# Patient Record
Sex: MALE | Race: BLACK OR AFRICAN AMERICAN | Employment: FULL TIME | URBAN - METROPOLITAN AREA
[De-identification: names, ages, dates, MRNs, and addresses within clinical notes are randomized per-mention and may not be internally consistent; named-entity substitution may affect disease eponyms.]

---
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
## 2022-04-07 ENCOUNTER — HOSPITAL ENCOUNTER (EMERGENCY)
Age: 22
Discharge: HOME | End: 2022-04-08
Attending: EMERGENCY MEDICINE
Payer: COMMERCIAL

## 2022-04-07 DIAGNOSIS — Z76.89 ENCOUNTER FOR ASSESSMENT OF STD EXPOSURE: Primary | ICD-10-CM

## 2022-04-07 LAB
BILIRUBIN URINE: NEGATIVE
BLOOD, URINE: NEGATIVE
CLARITY: CLEAR
COLOR: YELLOW
GLUCOSE URINE: NEGATIVE MG/DL
KETONES, URINE: NEGATIVE MG/DL
LEUKOCYTE ESTERASE, URINE: NEGATIVE
MICROSCOPIC EXAMINATION: NORMAL
NITRITE, URINE: NEGATIVE
PH UA: 8 (ref 5–8)
PROTEIN UA: NEGATIVE MG/DL
SPECIFIC GRAVITY UA: 1.01 (ref 1–1.03)
URINE REFLEX TO CULTURE: NORMAL
URINE TRICHOMONAS EVALUATION: NORMAL
URINE TYPE: NORMAL
UROBILINOGEN, URINE: 0.2 E.U./DL

## 2022-04-07 PROCEDURE — 87491 CHLMYD TRACH DNA AMP PROBE: CPT

## 2022-04-07 PROCEDURE — 87591 N.GONORRHOEAE DNA AMP PROB: CPT

## 2022-04-07 PROCEDURE — 81001 URINALYSIS AUTO W/SCOPE: CPT

## 2022-04-07 PROCEDURE — 99284 EMERGENCY DEPT VISIT MOD MDM: CPT

## 2022-04-08 VITALS
HEIGHT: 66 IN | WEIGHT: 180 LBS | HEART RATE: 79 BPM | SYSTOLIC BLOOD PRESSURE: 136 MMHG | DIASTOLIC BLOOD PRESSURE: 71 MMHG | TEMPERATURE: 97 F | OXYGEN SATURATION: 99 % | RESPIRATION RATE: 16 BRPM | BODY MASS INDEX: 28.93 KG/M2

## 2022-04-08 LAB
C. TRACHOMATIS DNA ,URINE: NEGATIVE
N. GONORRHOEAE DNA, URINE: NEGATIVE

## 2022-04-08 NOTE — ED PROVIDER NOTES
CHIEF COMPLAINT  Exposure to STD (Pts partner states he feels like he has an STD. Pt states no symptoms but wants to get checked out.)      HISTORY OF PRESENT ILLNESS  Corrine Ramírez is a 25 y.o. male presents to the ED with concern for the possibility of STD, he has been sexually active with male partner, but only oral sex, states he has not had any anal penetrative intercourse, requesting STD testing, not currently having symptoms. No dysuria/hematuria/urgency/frequency, no abdominal or flank pain, no sores or lesions of the genitals, no sore throat or fever, no other complaints, modifying factors or associated symptoms. I have reviewed the following from the nursing documentation. Past Medical History:   Diagnosis Date    Anxiety     Asthma     Depression     Dysphonia     Gender dysphoria      Past Surgical History:   Procedure Laterality Date    TYMPANOSTOMY TUBE PLACEMENT       No family history on file. Social History     Socioeconomic History    Marital status: Single     Spouse name: Not on file    Number of children: Not on file    Years of education: Not on file    Highest education level: Not on file   Occupational History    Not on file   Tobacco Use    Smoking status: Former Smoker    Smokeless tobacco: Never Used   Substance and Sexual Activity    Alcohol use: Never    Drug use: Yes     Types: Marijuana Myrtis Regulus)     Comment: occasionally    Sexual activity: Not on file   Other Topics Concern    Not on file   Social History Narrative    Not on file     Social Determinants of Health     Financial Resource Strain:     Difficulty of Paying Living Expenses: Not on file   Food Insecurity:     Worried About [address] in the Last Year: Not on file    Lam of Food in the Last Year: Not on file   Transportation Needs:     Lack of Transportation (Medical): Not on file    Lack of Transportation (Non-Medical):  Not on file   Physical Activity:     Days of Exercise per Week: Not on file    Minutes of Exercise per Session: Not on file   Stress:     Feeling of Stress : Not on file   Social Connections:     Frequency of Communication with Friends and Family: Not on file    Frequency of Social Gatherings with Friends and Family: Not on file    Attends Holiness Services: Not on file    Active Member of Clubs or Organizations: Not on file    Attends Club or Organization Meetings: Not on file    Marital Status: Not on file   Intimate Partner Violence:     Fear of Current or Ex-Partner: Not on file    Emotionally Abused: Not on file    Physically Abused: Not on file    Sexually Abused: Not on file   Housing Stability:     Unable to Pay for Housing in the Last Year: Not on file    Number of [place] in the Last Year: Not on file    Unstable Housing in the Last Year: Not on file     No current facility-administered medications for this encounter. Current Outpatient Medications   Medication Sig Dispense Refill    spironolactone (ALDACTONE) 50 MG tablet Take 50 mg by mouth every morning      estradiol (ESTRACE) 2 MG tablet Place 2 mg under the tongue 2 times daily      albuterol sulfate  (90 Base) MCG/ACT inhaler Inhale 4 puffs into the lungs every 4 hours as needed      ibuprofen (ADVIL;MOTRIN) 600 MG tablet Take 1 tablet by mouth 3 times daily as needed for Pain 30 tablet 0    Mometasone Furo-Formoterol Fum (DULERA IN) Inhale 2 puffs into the lungs daily        Allergies   Allergen Reactions    Macadamia Nut Oil     Pistachio Nut Extract Skin Test Itching       REVIEW OF SYSTEMS  10 systems reviewed, pertinent positives per HPI otherwise noted to be negative. PHYSICAL EXAM  /71   Pulse 79   Temp 97 °F (36.1 °C) (Oral)   Resp 16   Ht 5' 6\" (1.676 m)   Wt 180 lb (81.6 kg)   SpO2 99%   BMI 29.05 kg/m²   GENERAL APPEARANCE: Awake and alert. Cooperative. No acute distress  HEAD: Normocephalic. Atraumatic. EYES: PERRL. EOM's grossly intact. ENT: Mucous membranes are moist.  Airway patent, no stridor  NECK: Supple. No rigidity  HEART: RRR. No murmurs  LUNGS: Respirations nonlabored. Lungs are clear to auscultation bilaterally. ABDOMEN: Soft. Non-distended. Non-tender. No guarding or rebound. Normal bowel sounds. No CVA tenderness, he declined pelvic/genital exam  EXTREMITIES: No peripheral edema. Moves all extremities equally. All extremities neurovascularly intact. SKIN: Warm and dry. No acute rashes. NEUROLOGICAL: Alert and oriented. No gross facial drooping. Normal speech, steady gait  PSYCHIATRIC: Normal mood and affect. LABS  I have reviewed all labs for this visit. Results for orders placed or performed during the hospital encounter of 04/07/22   C.trachomatis N.gonorrhoeae DNA, Urine   Result Value Ref Range    C. trachomatis DNA ,Urine Negative Negative    N. gonorrhoeae DNA, Urine Negative Negative   Urinalysis with Reflex to Culture    Specimen: Urine, clean catch   Result Value Ref Range    Color, UA Yellow Straw/Yellow    Clarity, UA Clear Clear    Glucose, Ur Negative Negative mg/dL    Bilirubin Urine Negative Negative    Ketones, Urine Negative Negative mg/dL    Specific Gravity, UA 1.015 1.005 - 1.030    Blood, Urine Negative Negative    pH, UA 8.0 5.0 - 8.0    Protein, UA Negative Negative mg/dL    Urobilinogen, Urine 0.2 <2.0 E.U./dL    Nitrite, Urine Negative Negative    Leukocyte Esterase, Urine Negative Negative    Microscopic Examination Not Indicated     Urine Type NotGiven     Urine Reflex to Culture Not Indicated    Urine Trichomonas Evaluation   Result Value Ref Range    Urine Trichomonas Evaluation None Seen        ED COURSE/MDM  Patient seen and evaluated. Old records reviewed. Labs and imaging reviewed and results discussed with patient.    60-year-old male presenting for STD testing, he did not want preemptively treated, but sent gonorrhea/chlamydia testing, no trichomonas, no nitrite/leukoesterase, asymptomatic, encourage primary care follow-up, strict return precautions given, all questions answered, will return if any worsening symptoms or new concerns, see AVS for further discharge information, patient verbalized understanding of plan, felt comfortable going home. Orders Placed This Encounter   Procedures    C. Trachomatis / N. Gonorrhoeae, DNA    C.trachomatis N.gonorrhoeae DNA, Urine    Urinalysis with Reflex to Culture    Urine Trichomonas Evaluation       CLINICAL IMPRESSION  1. Encounter for assessment of STD exposure        Blood pressure 136/71, pulse 79, temperature 97 °F (36.1 °C), temperature source Oral, resp. rate 16, height 5' 6\" (1.676 m), weight 180 lb (81.6 kg), SpO2 99 %. DISPOSITION  Espinoza Pagan was discharged to home in stable condition.                    Casandra Silverio DO  04/12/22 5758

## 2022-07-22 ENCOUNTER — HOSPITAL ENCOUNTER (EMERGENCY)
Age: 22
Discharge: HOME | End: 2022-07-22
Attending: EMERGENCY MEDICINE
Payer: COMMERCIAL

## 2022-07-22 VITALS
BODY MASS INDEX: 29.89 KG/M2 | RESPIRATION RATE: 16 BRPM | SYSTOLIC BLOOD PRESSURE: 132 MMHG | HEIGHT: 67 IN | WEIGHT: 190.44 LBS | HEART RATE: 94 BPM | TEMPERATURE: 98.1 F | OXYGEN SATURATION: 98 % | DIASTOLIC BLOOD PRESSURE: 78 MMHG

## 2022-07-22 DIAGNOSIS — S39.012A STRAIN OF LUMBAR REGION, INITIAL ENCOUNTER: Primary | ICD-10-CM

## 2022-07-22 PROCEDURE — 99283 EMERGENCY DEPT VISIT LOW MDM: CPT

## 2022-07-22 RX ORDER — LIDOCAINE 50 MG/G
1 PATCH TOPICAL DAILY PRN
Qty: 15 PATCH | Refills: 0 | Status: SHIPPED | OUTPATIENT
Start: 2022-07-22 | End: 2022-08-06

## 2022-07-22 RX ORDER — IBUPROFEN 600 MG/1
600 TABLET ORAL EVERY 8 HOURS PRN
Qty: 20 TABLET | Refills: 0 | Status: SHIPPED | OUTPATIENT
Start: 2022-07-22

## 2022-07-22 RX ORDER — METHOCARBAMOL 750 MG/1
750 TABLET, FILM COATED ORAL 3 TIMES DAILY PRN
Qty: 30 TABLET | Refills: 0 | Status: SHIPPED | OUTPATIENT
Start: 2022-07-22 | End: 2022-08-01

## 2022-07-22 ASSESSMENT — PAIN SCALES - GENERAL: PAINLEVEL_OUTOF10: 8

## 2022-07-22 ASSESSMENT — PAIN - FUNCTIONAL ASSESSMENT: PAIN_FUNCTIONAL_ASSESSMENT: 0-10

## 2022-07-22 ASSESSMENT — PAIN DESCRIPTION - LOCATION: LOCATION: BACK

## 2022-07-22 ASSESSMENT — PAIN DESCRIPTION - ORIENTATION: ORIENTATION: LOWER

## 2022-07-22 NOTE — Clinical Note
Catherine Darnell was seen and treated in our emergency department on 7/22/2022. He may return to work on 07/25/2022. If you have any questions or concerns, please don't hesitate to call.       Kwaku Lloyd MD

## 2022-07-22 NOTE — DISCHARGE INSTRUCTIONS
Try using ice on the area of soreness for the next few days. If this does not seem to help use warm compresses instead. Use the Lidoderm pain patches 1 patch on the area of most pain on 12 hours then off 12 hours daily as needed for pain. Take the ibuprofen 3 times daily with food if needed for pain. Take the Robaxin muscle relaxant 3 times daily if needed for muscle spasm or tightness. Follow-up with your primary care doctor.

## 2022-07-22 NOTE — ED PROVIDER NOTES
TRIAGE CHIEF COMPLAINT:   Chief Complaint   Patient presents with    Back Pain         HPI: Veronica Saucedo is a 25 y.o. male who presents to the Emergency Department with complaint of lower back pain that started about 5 days ago after she bent over. Denies any specific injury. No new activity or exercise. The pain does not radiate down her legs. No bowel or bladder complaint. No numbness or weakness. No UTI symptoms. Denies fever or chills. No abdominal pain. She has not taken any medications for this. Patient is a trans-F on hormonal therapy. REVIEW OF SYSTEMS:  6 systems reviewed. Pertinent positives per HPI. Otherwise noted to be negative. Nursing notes reviewed and agree with above. Past medical/surgical history reviewed. MEDICATIONS   Patient's Medications   New Prescriptions    No medications on file   Previous Medications    ALBUTEROL SULFATE  (90 BASE) MCG/ACT INHALER    Inhale 4 puffs into the lungs every 4 hours as needed    ESTRADIOL (ESTRACE) 2 MG TABLET    Place 2 mg under the tongue 2 times daily    IBUPROFEN (ADVIL;MOTRIN) 600 MG TABLET    Take 1 tablet by mouth 3 times daily as needed for Pain    MOMETASONE FURO-FORMOTEROL FUM (DULERA IN)    Inhale 2 puffs into the lungs daily     SPIRONOLACTONE (ALDACTONE) 50 MG TABLET    Take 50 mg by mouth every morning   Modified Medications    No medications on file   Discontinued Medications    No medications on file         ALLERGIES   Allergies   Allergen Reactions    Macadamia Nut Oil     Other      All tree nuts    Pistachio Nut Extract Skin Test Itching         /78   Pulse 94   Temp 98.1 °F (36.7 °C) (Oral)   Resp 16   Ht 5' 7\" (1.702 m)   Wt 190 lb 7 oz (86.4 kg)   SpO2 98%   BMI 29.83 kg/m²   General:  No acute distress. Non toxic appearance  Head:   Normocephalic and atraumatic  Eyes:   Conjunctiva clear, JUDE, EOM's intact. Sclera anicteric. ENT:   Mucous membranes moist  Neck:   Supple.  No adenopathy. Lungs/Chest:  No respiratory distress  CVS:   Regular rate and rhythm  Abdomen: Bowel sounds normal.  Soft and nontender. Extremities:  Full range of motion  Skin:   No rashes or lesions to exposed skin  Back:   Mild paralumbar tenderness noted to palpation. No bony tenderness. Range of motion is limited primarily in forward flexion. She has good lateral bending in both directions. Straight leg raises are negative to 90 degrees bilaterally. There is no foot drop. Plantar flexion is normal.  Normal muscle strength in the quadriceps, hamstrings, gastrocnemius and EHL bilaterally. Normal sensation to light touch. Neuro:  Alert and OX3. Speech clear and appropriate. No upper/lower extremity weakness. Normal sensation in all extremities. No facial asymmetry or weakness. Gait normal.  Psych:   Affect normal. Mood normal        RADIOLOGY:      LAB      ED COURSE / MDM:  25year-old transfemale with lower back pain that started 5 days ago clinically has lumbar strain. She is neurologically intact. No evidence to suggest cauda equina syndrome or spinal epidural abscess. I did not feel imaging was indicated. Abdomen is benign. No urinary symptoms. Recommended treatment with Lidoderm patches and ibuprofen for pain along with Robaxin for muscle tightness. Advise ice or heat to the area. Recommended follow-up with primary care. I discussed with Joseline Christine the results of the evaluation in the Emergency Department, diagnosis, care, prognosis and the importance of follow-up. The patient is stable for discharge. The patient and/or family are in agreement with the plan and all questions have been answered. Specific discharge instructions were explained, including reasons to return to the emergency department.       (Please note that portions of this note may have been completed with a voice recognition program.  Efforts were made to edit the dictation but occasionally words are mis-transcribed)        FINAL IMPRESSION:  1 --lumbar strain                      Stephan Stewart MD  07/22/22 8401

## 2022-08-04 ENCOUNTER — HOSPITAL ENCOUNTER (EMERGENCY)
Age: 22
Discharge: HOME | End: 2022-08-04
Payer: COMMERCIAL

## 2022-08-04 ENCOUNTER — APPOINTMENT (OUTPATIENT)
Dept: GENERAL RADIOLOGY | Age: 22
End: 2022-08-04
Payer: COMMERCIAL

## 2022-08-04 VITALS
HEART RATE: 90 BPM | WEIGHT: 192.68 LBS | SYSTOLIC BLOOD PRESSURE: 132 MMHG | DIASTOLIC BLOOD PRESSURE: 65 MMHG | RESPIRATION RATE: 17 BRPM | BODY MASS INDEX: 30.97 KG/M2 | HEIGHT: 66 IN | TEMPERATURE: 98.4 F | OXYGEN SATURATION: 100 %

## 2022-08-04 DIAGNOSIS — S39.012S LUMBAR STRAIN, SEQUELA: Primary | ICD-10-CM

## 2022-08-04 PROCEDURE — 99283 EMERGENCY DEPT VISIT LOW MDM: CPT

## 2022-08-04 PROCEDURE — 72100 X-RAY EXAM L-S SPINE 2/3 VWS: CPT

## 2022-08-04 ASSESSMENT — ENCOUNTER SYMPTOMS
SHORTNESS OF BREATH: 0
ABDOMINAL PAIN: 0
EYE PAIN: 0
NAUSEA: 0
BACK PAIN: 1
SORE THROAT: 0
COUGH: 0
VOMITING: 0

## 2022-08-04 NOTE — ED PROVIDER NOTES
**ADVANCED PRACTICE PROVIDER, I HAVE EVALUATED THIS PATIENT**        [address]      Pt Name: Daniella Ayala  EOX:[id number]  [place] 2000  Date of evaluation: 8/4/2022  Provider: Rizwana Dey PA-C      Chief Complaint:    Chief Complaint   Patient presents with    Back Pain     Pt reports back pain \"for the last 2 weeks\" that has not improved. Pt states he was seen at another ED and prescribed medication with no improvement. Pt alert and oriented at this time with no signs of distress noted. Pt rates pain as a 9/10. Nursing Notes, Past Medical Hx, Past Surgical Hx, Social Hx, Allergies, and Family Hx were all reviewed and agreed with or any disagreements were addressed in the HPI.    HPI: (Location, Duration, Timing, Severity, Quality, Assoc Sx, Context, Modifying factors)    Chief Complaint of low back pain. Patient states she has had low back pain for the last 2 weeks. He was seen at [facility] in [place]. He was given lidocaine patch, muscle relaxer and anti-inflammatory and got no relief. Denies any new injury. No numbness or tingling in his feet or finger. No loss of bowel or urinary control. No upper back or neck pain. Denies extremity weakness. No chest pain, no nausea or vomiting. No other complaints. This is a  25 y.o. male who presents to the emergency room with the above complaint.     PastMedical/Surgical History:      Diagnosis Date    Anxiety     Asthma     Depression     Dysphonia     Gender dysphoria          Procedure Laterality Date    TYMPANOSTOMY TUBE PLACEMENT         Medications:  Previous Medications    ALBUTEROL SULFATE  (90 BASE) MCG/ACT INHALER    Inhale 4 puffs into the lungs every 4 hours as needed    ESTRADIOL (ESTRACE) 2 MG TABLET    Place 2 mg under the tongue 2 times daily    IBUPROFEN (IBU) 600 MG TABLET    Take 1 tablet by mouth every 8 hours as needed for Pain or Fever (with food)    LIDOCAINE (LIDODERM) 5 %    Place 1 patch onto the skin daily as needed for Pain 12 hours on, 12 hours off. MOMETASONE FURO-FORMOTEROL FUM (DULERA IN)    Inhale 2 puffs into the lungs daily     SPIRONOLACTONE (ALDACTONE) 50 MG TABLET    Take 50 mg by mouth every morning         Review of Systems:  (2-9 systems needed)  Review of Systems   Constitutional:  Negative for chills and fever. HENT:  Negative for congestion and sore throat. Eyes:  Negative for pain and visual disturbance. Respiratory:  Negative for cough and shortness of breath. Cardiovascular:  Negative for chest pain and leg swelling. Gastrointestinal:  Negative for abdominal pain, nausea and vomiting. Genitourinary:  Negative for dysuria and frequency. Musculoskeletal:  Positive for back pain. Negative for neck pain. Skin:  Negative for rash and wound. Neurological:  Negative for dizziness and light-headedness. \"Positives and Pertinent negatives as per HPI\"    Physical Exam:  Physical Exam  Vitals and nursing note reviewed. Cardiovascular:      Rate and Rhythm: Normal rate and regular rhythm. Heart sounds: Normal heart sounds. No murmur heard. No friction rub. No gallop. Pulmonary:      Effort: Pulmonary effort is normal. No respiratory distress. Breath sounds: Normal breath sounds. No wheezing or rales. Chest:      Chest wall: No tenderness. Abdominal:      General: Abdomen is flat. Bowel sounds are normal. There is no distension. Palpations: Abdomen is soft. There is no mass. Tenderness: There is no abdominal tenderness. There is no guarding or rebound. Musculoskeletal:         General: Normal range of motion. Cervical back: Normal.      Thoracic back: Normal.      Lumbar back: Tenderness present. No bony tenderness. Normal range of motion.  Negative right straight leg raise test and negative left straight leg raise test.        Back:    Neurological:      General: No focal deficit present. MEDICAL DECISION MAKING    Vitals:    Vitals:    08/04/22 0038   BP: 135/82   Pulse: 93   Resp: 16   Temp: 98.4 °F (36.9 °C)   TempSrc: Oral   SpO2: 99%   Weight: 192 lb 10.9 oz (87.4 kg)   Height: 5' 6\" (1.676 m)       LABS:Labs Reviewed - No data to display     Remainder of labs reviewed and were negative at this time or not returned at the time of this note. RADIOLOGY:   Non-plain film images such as CT, Ultrasound and MRI are read by the radiologist. Aure Guzman PA-C have directly visualized the radiologic plain film image(s) with the below findings:      Interpretation per the Radiologist below, if available at the time of this note:    XR LUMBAR SPINE (2-3 VIEWS)   Final Result   Unremarkable lumbar spine without acute osseous abnormality or significant   degenerative change. XR LUMBAR SPINE (2-3 VIEWS)    Result Date: 8/4/2022  EXAMINATION: THREE XRAY VIEWS OF THE LUMBAR SPINE 8/4/2022 1:27 am COMPARISON: None. HISTORY: ORDERING SYSTEM PROVIDED HISTORY: Pain TECHNOLOGIST PROVIDED HISTORY: Reason for exam:->Pain Reason for Exam: low back pain  no injury FINDINGS: 5 non-rib-bearing lumbar vertebral bodies. No fracture or dislocation. No acute osseous abnormality. No pars defects are identified. No osseous erosive changes. No acute soft tissue abnormality. Mild stool volume in the colon. Unremarkable lumbar spine without acute osseous abnormality or significant degenerative change. MEDICAL DECISION MAKING / ED COURSE:      PROCEDURES:   Procedures    None    Patient was given:  Medications - No data to display    Emergency room course: Patient on exam cardiovascular regular rhythm, lungs are clear. No wheeze, rales or rhonchi noted. No chest wall tenderness. Abdomen is soft nontender. Patient has no midline tender cervical, thoracic or lumbar spine.   Does have paraspinal muscle tenderness to the lower lumbar both right and left lower lumbar does not extend into his buttock. Negative straight leg raise bilaterally. No saddle anesthesia. Good strength against resisted plantar dorsiflexion. Patient is ambulatory with no difficulty. Mild discomfort with flexion and more with walking he says. He has no other motor or sensory deficit noted alert oriented x4. X-ray of the lumbar spine shows no acute osseous abnormality. See above. At this point I did discuss with patient her x-ray results. I will refer him to orthopedics. Did advise him apply heat to his back. Continue medication as prescribed. Return for any worsening. He will be discharged stable condition. The patient tolerated their visit well. I evaluated the patient. The physician was available for consultation as needed. The patient and / or the family were informed of the results of any tests, a time was given to answer questions, a plan was proposed and they agreed with plan. CLINICAL IMPRESSION:  1.  Lumbar strain, sequela        DISPOSITION Decision To Discharge 08/04/2022 02:22:46 AM      PATIENT REFERRED TO:  Aleisha Gaspar MD  [address] [place] [address] 13 [phone]    Call   As needed    Joelle Hunter MD  [address] [facility]  [address] [place] [address]  [phone]    Call in 1 day      DISCHARGE MEDICATIONS:  New Prescriptions    No medications on file       DISCONTINUED MEDICATIONS:  Discontinued Medications    No medications on file              (Please note the MDM and HPI sections of this note were completed with a voice recognition program.  Efforts were made to edit the dictations but occasionally words are mis-transcribed.)    Electronically signed, Viral Lemus PA-C,          Viral Lemus PA-C  08/04/22 1211

## 2022-08-04 NOTE — ED NOTES
Discharge and education instructions reviewed. Patient verbalized understanding, teach-back successful. Patient denied questions at this time. No acute distress noted. Patient instructed to follow-up as noted - return to emergency department if symptoms worsen. Patient verbalized understanding. Discharged per EDMD with discharge instructions.         Meena Read RN  08/04/22 3099

## 2022-08-04 NOTE — DISCHARGE INSTRUCTIONS
Apply heat to your back 3-4 times a day 20 to 30 minutes on. If you use a heating pad use medium to low heat. Follow-up with orthopedics Dr. Suma Murray. Return emergency room for any worsening.

## 2022-12-02 ENCOUNTER — HOSPITAL ENCOUNTER (EMERGENCY)
Age: 22
Discharge: HOME | End: 2022-12-02
Attending: EMERGENCY MEDICINE
Payer: COMMERCIAL

## 2022-12-02 VITALS
HEIGHT: 66 IN | RESPIRATION RATE: 16 BRPM | SYSTOLIC BLOOD PRESSURE: 133 MMHG | WEIGHT: 189.6 LBS | BODY MASS INDEX: 30.47 KG/M2 | DIASTOLIC BLOOD PRESSURE: 94 MMHG | TEMPERATURE: 97.9 F | HEART RATE: 93 BPM | OXYGEN SATURATION: 98 %

## 2022-12-02 DIAGNOSIS — J45.901 EXACERBATION OF ASTHMA, UNSPECIFIED ASTHMA SEVERITY, UNSPECIFIED WHETHER PERSISTENT: ICD-10-CM

## 2022-12-02 DIAGNOSIS — J02.9 ACUTE PHARYNGITIS, UNSPECIFIED ETIOLOGY: Primary | ICD-10-CM

## 2022-12-02 LAB — S PYO AG THROAT QL: NEGATIVE

## 2022-12-02 PROCEDURE — 87880 STREP A ASSAY W/OPTIC: CPT

## 2022-12-02 PROCEDURE — 99283 EMERGENCY DEPT VISIT LOW MDM: CPT

## 2022-12-02 PROCEDURE — 87081 CULTURE SCREEN ONLY: CPT

## 2022-12-02 RX ORDER — PREDNISONE 10 MG/1
50 TABLET ORAL DAILY
Qty: 25 TABLET | Refills: 0 | Status: SHIPPED | OUTPATIENT
Start: 2022-12-02 | End: 2022-12-07

## 2022-12-02 RX ORDER — IBUPROFEN 800 MG/1
800 TABLET ORAL EVERY 8 HOURS PRN
Qty: 30 TABLET | Refills: 0 | Status: SHIPPED | OUTPATIENT
Start: 2022-12-02

## 2022-12-02 ASSESSMENT — PAIN - FUNCTIONAL ASSESSMENT: PAIN_FUNCTIONAL_ASSESSMENT: 0-10

## 2022-12-02 ASSESSMENT — PAIN DESCRIPTION - DESCRIPTORS: DESCRIPTORS: SORE

## 2022-12-02 ASSESSMENT — PAIN DESCRIPTION - PAIN TYPE: TYPE: ACUTE PAIN

## 2022-12-02 ASSESSMENT — PAIN DESCRIPTION - LOCATION: LOCATION: THROAT

## 2022-12-02 ASSESSMENT — PAIN SCALES - GENERAL: PAINLEVEL_OUTOF10: 5

## 2022-12-02 NOTE — ED TRIAGE NOTES
PT arrives ambulatory for eval of sore throat onset this am, pt denies cough, denies n/v. Pt is a/ox4, rsp nonlabored and wpd.

## 2022-12-02 NOTE — Clinical Note
Amado Magana was seen and treated in our emergency department on 12/2/2022. He may return to work on 12/03/2022. If you have any questions or concerns, please don't hesitate to call.       Irlanda Marrufo MD

## 2022-12-02 NOTE — ED PROVIDER NOTES
Emergency Physician Note        Note Open Time: 11:35 AM EST    Chief Complaint  Pharyngitis (PT arrives ambulatory for eval of sore throat onset this am, pt denies cough, denies n/v)       History of Present Illness  Danish Abreu is a 23 y.o. male who presents to the ED for sore throat. Patient reports sore throat that began this morning. Patient has asthma and has an inhaler refill waiting. He has had some shortness of breath. No fevers, chills or sweats. Patient had strep a month ago and wanted to be rechecked because of symptoms today. Patient also feels like asthma is acting up. 10 systems reviewed, pertinent positives per HPI otherwise noted to be negative    I have reviewed the following from the nursing documentation:      Prior to Admission medications    Medication Sig Start Date End Date Taking? Authorizing Provider   ibuprofen (IBU) 600 MG tablet Take 1 tablet by mouth every 8 hours as needed for Pain or Fever (with food) 7/22/22   Stanford Garcia MD   spironolactone (ALDACTONE) 50 MG tablet Take 50 mg by mouth every morning 10/11/21   Historical Provider, MD   estradiol (ESTRACE) 2 MG tablet Place 2 mg under the tongue 2 times daily 10/12/21   Historical Provider, MD   albuterol sulfate  (90 Base) MCG/ACT inhaler Inhale 4 puffs into the lungs every 4 hours as needed 10/14/21   Historical Provider, MD   Mometasone Furo-Formoterol Fum (DULERA IN) Inhale 2 puffs into the lungs daily     Historical Provider, MD       Allergies as of 12/02/2022 - Fully Reviewed 12/02/2022   Allergen Reaction Noted    Macadamia nut oil  08/23/2021    Other  07/22/2022    Pistachio nut extract skin test Itching 09/06/2019       Past Medical History:   Diagnosis Date    Anxiety     Asthma     Depression     Dysphonia     Gender dysphoria         Surgical History:   Past Surgical History:   Procedure Laterality Date    TYMPANOSTOMY TUBE PLACEMENT          Family History:  History reviewed.  No pertinent family history. Social History     Socioeconomic History    Marital status: Single     Spouse name: Not on file    Number of children: Not on file    Years of education: Not on file    Highest education level: Not on file   Occupational History    Not on file   Tobacco Use    Smoking status: Former    Smokeless tobacco: Never   Vaping Use    Vaping Use: Never used   Substance and Sexual Activity    Alcohol use: Never    Drug use: Not Currently     Types: Marijuana Jen Borges)     Comment: occasionally    Sexual activity: Not on file   Other Topics Concern    Not on file   Social History Narrative    Not on file     Social Determinants of Health     Financial Resource Strain: Not on file   Food Insecurity: Not on file   Transportation Needs: Not on file   Physical Activity: Not on file   Stress: Not on file   Social Connections: Not on file   Intimate Partner Violence: Not on file   Housing Stability: Not on file       Nursing notes reviewed. ED Triage Vitals [12/02/22 1003]   Enc Vitals Group      BP (!) 133/94      Heart Rate 93      Resp 16      Temp 97.9 °F (36.6 °C)      Temp Source Oral      SpO2 98 %      Weight 189 lb 9.5 oz (86 kg)      Height 5' 6\" (1.676 m)      Head Circumference       Peak Flow       Pain Score       Pain Loc       Pain Edu? Excl. in [address]? GENERAL:  Awake, alert. Well developed, well nourished with no apparent distress. HENT:  Normocephalic, Atraumatic, moist mucous membranes. Posterior oropharynx erythematous with 1+ edema and no exudates noted. EYES:  Pupils equal round and reactive to light, Conjunctiva normal, extraocular movements normal.  NECK:  No meningeal signs, Supple. CHEST:  Regular rate and rhythm, chest wall non-tender. LUNGS:  Clear to auscultation bilaterally. EXTREMITIES:  Normal range of motion, no edema, no bony tenderness, no deformity, distal pulses present. SKIN: Warm, dry and intact. NEUROLOGIC: Normal mental status.  Moving all extremities to command. LABS  Labs Reviewed   STREP SCREEN GROUP A THROAT   CULTURE, BETA STREP CONFIRM PLATES         MEDICAL DECISION MAKING  Given patient's report of wheezing at home and need for inhaler I am giving steroids. I advised the patient to return to the emergency department immediately for any new or worsening symptoms, such as fever, chest pain or shortness of breath. The patient voiced agreement and understanding of the treatment plan. Results for orders placed or performed during the hospital encounter of 12/02/22   Strep Screen Group A Throat    Specimen: Throat   Result Value Ref Range    Rapid Strep A Screen Negative Negative         I estimate there is LOW risk for EPIGLOTTITIS, PNEUMONIA, MENINGITIS, OR URINARY TRACT INFECTION, thus I consider the discharge disposition reasonable. Also, there is no evidence or peritonitis, sepsis, or toxicity. [address]ba and I have discussed the diagnosis and risks, and we agree with discharging home to follow-up with their primary doctor. We also discussed returning to the Emergency Department immediately if new or worsening symptoms occur. We have discussed the symptoms which are most concerning (e.g., changing or worsening pain, trouble swallowing or breating, neck stiffness, fever) that necessitate immediate return. Final Impression    1. Acute pharyngitis, unspecified etiology    2. Exacerbation of asthma, unspecified asthma severity, unspecified whether persistent        Discharge Vital Signs:  Blood pressure (!) 133/94, pulse 93, temperature 97.9 °F (36.6 °C), temperature source Oral, resp. rate 16, height 5' 6\" (1.676 m), weight 189 lb 9.5 oz (86 kg), SpO2 98 %. Patient was given scripts for the following medications. I counseled patient how to take these medications.   Discharge Medication List as of 12/2/2022 11:40 AM        START taking these medications    Details   ibuprofen (ADVIL;MOTRIN) 800 MG tablet Take 1 tablet by mouth every 8 hours as needed for Pain, Disp-30 tablet, R-0Normal      predniSONE (DELTASONE) 10 MG tablet Take 5 tablets by mouth daily for 5 days, Disp-25 tablet, R-0Normal             Disposition  Pt is in good condition upon Discharge to home. This chart was generated using the [address] [place] [address] [address]  [employer]ation system. I created this record but it may contain dictation errors.          Osmel Wadsworth MD  12/02/22 2247

## 2022-12-04 LAB — S PYO THROAT QL CULT: NORMAL

## 2022-12-06 LAB — S PYO THROAT QL CULT: NORMAL

## 2022-12-14 ENCOUNTER — HOSPITAL ENCOUNTER (OUTPATIENT)
Dept: ULTRASOUND IMAGING | Age: 22
Discharge: HOME | End: 2022-12-14
Payer: COMMERCIAL

## 2022-12-14 DIAGNOSIS — R74.8 ELEVATED LIVER ENZYMES: ICD-10-CM

## 2022-12-14 PROCEDURE — 76705 ECHO EXAM OF ABDOMEN: CPT
